# Patient Record
Sex: FEMALE | Race: WHITE | NOT HISPANIC OR LATINO | URBAN - METROPOLITAN AREA
[De-identification: names, ages, dates, MRNs, and addresses within clinical notes are randomized per-mention and may not be internally consistent; named-entity substitution may affect disease eponyms.]

---

## 2018-03-13 ENCOUNTER — EMERGENCY (EMERGENCY)
Facility: HOSPITAL | Age: 38
LOS: 1 days | Discharge: HOME | End: 2018-03-13
Attending: EMERGENCY MEDICINE | Admitting: EMERGENCY MEDICINE
Payer: COMMERCIAL

## 2018-03-13 VITALS
RESPIRATION RATE: 18 BRPM | OXYGEN SATURATION: 99 % | SYSTOLIC BLOOD PRESSURE: 134 MMHG | HEART RATE: 90 BPM | TEMPERATURE: 98 F | DIASTOLIC BLOOD PRESSURE: 81 MMHG

## 2018-03-13 VITALS — WEIGHT: 192.02 LBS

## 2018-03-13 DIAGNOSIS — Z90.89 ACQUIRED ABSENCE OF OTHER ORGANS: Chronic | ICD-10-CM

## 2018-03-13 DIAGNOSIS — Z98.82 BREAST IMPLANT STATUS: Chronic | ICD-10-CM

## 2018-03-13 DIAGNOSIS — Z90.49 ACQUIRED ABSENCE OF OTHER SPECIFIED PARTS OF DIGESTIVE TRACT: Chronic | ICD-10-CM

## 2018-03-13 DIAGNOSIS — Z96.649 PRESENCE OF UNSPECIFIED ARTIFICIAL HIP JOINT: Chronic | ICD-10-CM

## 2018-03-13 LAB
ALBUMIN SERPL ELPH-MCNC: 4.7 G/DL — SIGNIFICANT CHANGE UP (ref 3.3–5.2)
ALP SERPL-CCNC: 87 U/L — SIGNIFICANT CHANGE UP (ref 40–120)
ALT FLD-CCNC: 17 U/L — SIGNIFICANT CHANGE UP
ANION GAP SERPL CALC-SCNC: 16 MMOL/L — SIGNIFICANT CHANGE UP (ref 5–17)
APPEARANCE UR: CLEAR — SIGNIFICANT CHANGE UP
AST SERPL-CCNC: 16 U/L — SIGNIFICANT CHANGE UP
BASOPHILS # BLD AUTO: 0 K/UL — SIGNIFICANT CHANGE UP (ref 0–0.2)
BASOPHILS NFR BLD AUTO: 0.4 % — SIGNIFICANT CHANGE UP (ref 0–2)
BILIRUB SERPL-MCNC: 0.3 MG/DL — LOW (ref 0.4–2)
BILIRUB UR-MCNC: NEGATIVE — SIGNIFICANT CHANGE UP
BUN SERPL-MCNC: 15 MG/DL — SIGNIFICANT CHANGE UP (ref 8–20)
CALCIUM SERPL-MCNC: 9.7 MG/DL — SIGNIFICANT CHANGE UP (ref 8.6–10.2)
CHLORIDE SERPL-SCNC: 102 MMOL/L — SIGNIFICANT CHANGE UP (ref 98–107)
CO2 SERPL-SCNC: 24 MMOL/L — SIGNIFICANT CHANGE UP (ref 22–29)
COLOR SPEC: YELLOW — SIGNIFICANT CHANGE UP
CREAT SERPL-MCNC: 0.58 MG/DL — SIGNIFICANT CHANGE UP (ref 0.5–1.3)
DIFF PNL FLD: ABNORMAL
EOSINOPHIL # BLD AUTO: 0.2 K/UL — SIGNIFICANT CHANGE UP (ref 0–0.5)
EOSINOPHIL NFR BLD AUTO: 1.7 % — SIGNIFICANT CHANGE UP (ref 0–6)
EPI CELLS # UR: SIGNIFICANT CHANGE UP
GLUCOSE SERPL-MCNC: 93 MG/DL — SIGNIFICANT CHANGE UP (ref 70–115)
GLUCOSE UR QL: NEGATIVE MG/DL — SIGNIFICANT CHANGE UP
HCG SERPL-ACNC: <5 MIU/ML — SIGNIFICANT CHANGE UP
HCT VFR BLD CALC: 45.2 % — SIGNIFICANT CHANGE UP (ref 37–47)
HGB BLD-MCNC: 15.5 G/DL — SIGNIFICANT CHANGE UP (ref 12–16)
KETONES UR-MCNC: ABNORMAL
LEUKOCYTE ESTERASE UR-ACNC: NEGATIVE — SIGNIFICANT CHANGE UP
LIDOCAIN IGE QN: 22 U/L — SIGNIFICANT CHANGE UP (ref 22–51)
LYMPHOCYTES # BLD AUTO: 35.6 % — SIGNIFICANT CHANGE UP (ref 20–55)
LYMPHOCYTES # BLD AUTO: 4.3 K/UL — SIGNIFICANT CHANGE UP (ref 1–4.8)
MCHC RBC-ENTMCNC: 31 PG — SIGNIFICANT CHANGE UP (ref 27–31)
MCHC RBC-ENTMCNC: 34.3 G/DL — SIGNIFICANT CHANGE UP (ref 32–36)
MCV RBC AUTO: 90.4 FL — SIGNIFICANT CHANGE UP (ref 81–99)
MONOCYTES # BLD AUTO: 0.8 K/UL — SIGNIFICANT CHANGE UP (ref 0–0.8)
MONOCYTES NFR BLD AUTO: 6.8 % — SIGNIFICANT CHANGE UP (ref 3–10)
NEUTROPHILS # BLD AUTO: 6.7 K/UL — SIGNIFICANT CHANGE UP (ref 1.8–8)
NEUTROPHILS NFR BLD AUTO: 55.2 % — SIGNIFICANT CHANGE UP (ref 37–73)
NITRITE UR-MCNC: NEGATIVE — SIGNIFICANT CHANGE UP
PH UR: 6 — SIGNIFICANT CHANGE UP (ref 5–8)
PLATELET # BLD AUTO: 372 K/UL — SIGNIFICANT CHANGE UP (ref 150–400)
POTASSIUM SERPL-MCNC: 4 MMOL/L — SIGNIFICANT CHANGE UP (ref 3.5–5.3)
POTASSIUM SERPL-SCNC: 4 MMOL/L — SIGNIFICANT CHANGE UP (ref 3.5–5.3)
PROT SERPL-MCNC: 7.7 G/DL — SIGNIFICANT CHANGE UP (ref 6.6–8.7)
PROT UR-MCNC: 15 MG/DL
RBC # BLD: 5 M/UL — SIGNIFICANT CHANGE UP (ref 4.4–5.2)
RBC # FLD: 12.9 % — SIGNIFICANT CHANGE UP (ref 11–15.6)
RBC CASTS # UR COMP ASSIST: SIGNIFICANT CHANGE UP /HPF (ref 0–4)
SODIUM SERPL-SCNC: 142 MMOL/L — SIGNIFICANT CHANGE UP (ref 135–145)
SP GR SPEC: 1.02 — SIGNIFICANT CHANGE UP (ref 1.01–1.02)
UROBILINOGEN FLD QL: NEGATIVE MG/DL — SIGNIFICANT CHANGE UP
WBC # BLD: 12.1 K/UL — HIGH (ref 4.8–10.8)
WBC # FLD AUTO: 12.1 K/UL — HIGH (ref 4.8–10.8)
WBC UR QL: NEGATIVE — SIGNIFICANT CHANGE UP

## 2018-03-13 PROCEDURE — 74177 CT ABD & PELVIS W/CONTRAST: CPT

## 2018-03-13 PROCEDURE — 85027 COMPLETE CBC AUTOMATED: CPT

## 2018-03-13 PROCEDURE — 36415 COLL VENOUS BLD VENIPUNCTURE: CPT

## 2018-03-13 PROCEDURE — 84702 CHORIONIC GONADOTROPIN TEST: CPT

## 2018-03-13 PROCEDURE — 83690 ASSAY OF LIPASE: CPT

## 2018-03-13 PROCEDURE — 99284 EMERGENCY DEPT VISIT MOD MDM: CPT | Mod: 25

## 2018-03-13 PROCEDURE — 74177 CT ABD & PELVIS W/CONTRAST: CPT | Mod: 26

## 2018-03-13 PROCEDURE — 96375 TX/PRO/DX INJ NEW DRUG ADDON: CPT

## 2018-03-13 PROCEDURE — 80053 COMPREHEN METABOLIC PANEL: CPT

## 2018-03-13 PROCEDURE — 99284 EMERGENCY DEPT VISIT MOD MDM: CPT

## 2018-03-13 PROCEDURE — 96374 THER/PROPH/DIAG INJ IV PUSH: CPT | Mod: XU

## 2018-03-13 PROCEDURE — 81001 URINALYSIS AUTO W/SCOPE: CPT

## 2018-03-13 RX ORDER — SODIUM CHLORIDE 9 MG/ML
1000 INJECTION INTRAMUSCULAR; INTRAVENOUS; SUBCUTANEOUS
Qty: 0 | Refills: 0 | Status: DISCONTINUED | OUTPATIENT
Start: 2018-03-13 | End: 2018-03-17

## 2018-03-13 RX ORDER — KETOROLAC TROMETHAMINE 30 MG/ML
30 SYRINGE (ML) INJECTION ONCE
Qty: 0 | Refills: 0 | Status: DISCONTINUED | OUTPATIENT
Start: 2018-03-13 | End: 2018-03-13

## 2018-03-13 RX ORDER — METOCLOPRAMIDE HCL 10 MG
10 TABLET ORAL ONCE
Qty: 0 | Refills: 0 | Status: COMPLETED | OUTPATIENT
Start: 2018-03-13 | End: 2018-03-13

## 2018-03-13 RX ADMIN — Medication 10 MILLIGRAM(S): at 19:50

## 2018-03-13 RX ADMIN — SODIUM CHLORIDE 200 MILLILITER(S): 9 INJECTION INTRAMUSCULAR; INTRAVENOUS; SUBCUTANEOUS at 19:50

## 2018-03-13 RX ADMIN — Medication 30 MILLIGRAM(S): at 19:50

## 2018-03-13 NOTE — ED ADULT TRIAGE NOTE - CHIEF COMPLAINT QUOTE
pt c/o pain to her abdomen x months which worsened over the past 3 days. pt with nausea vomiting x 2 days

## 2018-03-13 NOTE — ED STATDOCS - PSH
S/P breast augmentation    S/P cholecystectomy    S/P hip hemiarthroplasty  age 9  S/P tonsillectomy

## 2018-03-13 NOTE — ED STATDOCS - MEDICAL DECISION MAKING DETAILS
Intermittent abdominal pain x months, increased x 3 days with nausea. Last BM yesterday. Admits to prior abdominal surgery. has not been evaluated for this. Line, labs, lipase for pancreatitis. CT for obstruction/pancreatitis/colitis. Treat symptomatically, check results, and reassess.

## 2018-03-13 NOTE — ED STATDOCS - OBJECTIVE STATEMENT
37 year old female presenting to the ED complaining of abdominal pain radiating to her back x 5 months that is recently worsening. She states that her pain used to be intermittent, but that it is becoming constant now. Pt states that her pain is more in her upper abdomen. She states that she feels nausea, has episodes of vomiting with the recent episode being 10:30AM today, and has tingling throughout her body, but denies having any fever, diarrhea, constipation. Pt states that she has PMHx of gastritis, tonsilitis, and hiatal hernia, and PSHx of cholecystectomy, hip surgery, ovarian cystectomy, , and breast augmentation. She states that she had taken Oxycodone for her pain which she is not prescribed but states that brought relief to her pain. Pt states that she is a smoker. No further complaints at this time.  PMD: Dr. Cage

## 2018-03-13 NOTE — ED ADULT NURSE NOTE - OBJECTIVE STATEMENT
pt alert and awake x3, arrived to ED with abd pain for months, states she has hx of hiatal hernia and acid reflux, denies chest pain/sob, denies urinary symptoms, LS clear.

## 2018-03-13 NOTE — ED STATDOCS - PROGRESS NOTE DETAILS
appears comfortable, feels better, hungry and wants to eat, impression with limitations discussed, findings discussed, can follow up with Dr. King, given copy of results to give to him.

## 2018-03-21 ENCOUNTER — TRANSCRIPTION ENCOUNTER (OUTPATIENT)
Age: 38
End: 2018-03-21

## 2018-03-21 ENCOUNTER — RESULT REVIEW (OUTPATIENT)
Age: 38
End: 2018-03-21

## 2018-03-21 ENCOUNTER — OUTPATIENT (OUTPATIENT)
Dept: OUTPATIENT SERVICES | Facility: HOSPITAL | Age: 38
LOS: 1 days | End: 2018-03-21
Payer: COMMERCIAL

## 2018-03-21 DIAGNOSIS — Z96.649 PRESENCE OF UNSPECIFIED ARTIFICIAL HIP JOINT: Chronic | ICD-10-CM

## 2018-03-21 DIAGNOSIS — K62.5 HEMORRHAGE OF ANUS AND RECTUM: ICD-10-CM

## 2018-03-21 DIAGNOSIS — K21.0 GASTRO-ESOPHAGEAL REFLUX DISEASE WITH ESOPHAGITIS: ICD-10-CM

## 2018-03-21 DIAGNOSIS — Z90.49 ACQUIRED ABSENCE OF OTHER SPECIFIED PARTS OF DIGESTIVE TRACT: Chronic | ICD-10-CM

## 2018-03-21 DIAGNOSIS — Z90.89 ACQUIRED ABSENCE OF OTHER ORGANS: Chronic | ICD-10-CM

## 2018-03-21 DIAGNOSIS — Z98.82 BREAST IMPLANT STATUS: Chronic | ICD-10-CM

## 2018-03-21 PROCEDURE — 45378 DIAGNOSTIC COLONOSCOPY: CPT

## 2018-03-21 PROCEDURE — 88342 IMHCHEM/IMCYTCHM 1ST ANTB: CPT

## 2018-03-21 PROCEDURE — 88305 TISSUE EXAM BY PATHOLOGIST: CPT

## 2018-03-21 PROCEDURE — 88305 TISSUE EXAM BY PATHOLOGIST: CPT | Mod: 26

## 2018-03-21 PROCEDURE — 88342 IMHCHEM/IMCYTCHM 1ST ANTB: CPT | Mod: 26

## 2018-03-21 PROCEDURE — 43239 EGD BIOPSY SINGLE/MULTIPLE: CPT

## 2018-03-27 LAB — SURGICAL PATHOLOGY FINAL REPORT - CH: SIGNIFICANT CHANGE UP

## 2020-01-22 ENCOUNTER — TRANSCRIPTION ENCOUNTER (OUTPATIENT)
Age: 40
End: 2020-01-22

## 2020-01-23 ENCOUNTER — TRANSCRIPTION ENCOUNTER (OUTPATIENT)
Age: 40
End: 2020-01-23

## 2020-01-23 ENCOUNTER — APPOINTMENT (OUTPATIENT)
Dept: GASTROENTEROLOGY | Facility: CLINIC | Age: 40
End: 2020-01-23
Payer: COMMERCIAL

## 2020-01-23 VITALS
HEART RATE: 89 BPM | WEIGHT: 185 LBS | BODY MASS INDEX: 36.32 KG/M2 | DIASTOLIC BLOOD PRESSURE: 98 MMHG | SYSTOLIC BLOOD PRESSURE: 129 MMHG | HEIGHT: 60 IN

## 2020-01-23 DIAGNOSIS — R10.9 UNSPECIFIED ABDOMINAL PAIN: ICD-10-CM

## 2020-01-23 DIAGNOSIS — K30 FUNCTIONAL DYSPEPSIA: ICD-10-CM

## 2020-01-23 DIAGNOSIS — R10.84 GENERALIZED ABDOMINAL PAIN: ICD-10-CM

## 2020-01-23 DIAGNOSIS — R12 HEARTBURN: ICD-10-CM

## 2020-01-23 DIAGNOSIS — Z87.19 PERSONAL HISTORY OF OTHER DISEASES OF THE DIGESTIVE SYSTEM: ICD-10-CM

## 2020-01-23 DIAGNOSIS — R10.13 EPIGASTRIC PAIN: ICD-10-CM

## 2020-01-23 DIAGNOSIS — R19.7 DIARRHEA, UNSPECIFIED: ICD-10-CM

## 2020-01-23 DIAGNOSIS — K21.9 GASTRO-ESOPHAGEAL REFLUX DISEASE W/OUT ESOPHAGITIS: ICD-10-CM

## 2020-01-23 DIAGNOSIS — Z83.3 FAMILY HISTORY OF DIABETES MELLITUS: ICD-10-CM

## 2020-01-23 DIAGNOSIS — Z78.9 OTHER SPECIFIED HEALTH STATUS: ICD-10-CM

## 2020-01-23 DIAGNOSIS — F17.200 NICOTINE DEPENDENCE, UNSPECIFIED, UNCOMPLICATED: ICD-10-CM

## 2020-01-23 DIAGNOSIS — R11.2 NAUSEA WITH VOMITING, UNSPECIFIED: ICD-10-CM

## 2020-01-23 DIAGNOSIS — R10.12 LEFT UPPER QUADRANT PAIN: ICD-10-CM

## 2020-01-23 DIAGNOSIS — R14.3 FLATULENCE: ICD-10-CM

## 2020-01-23 DIAGNOSIS — K31.9 DISEASE OF STOMACH AND DUODENUM, UNSPECIFIED: ICD-10-CM

## 2020-01-23 PROCEDURE — 99203 OFFICE O/P NEW LOW 30 MIN: CPT

## 2020-01-23 RX ORDER — SUCRALFATE 1 G/10ML
1 SUSPENSION ORAL 4 TIMES DAILY
Qty: 1 | Refills: 5 | Status: ACTIVE | COMMUNITY
Start: 2020-01-23 | End: 1900-01-01

## 2020-01-24 LAB
ALBUMIN SERPL ELPH-MCNC: 4.5 G/DL
ALP BLD-CCNC: 84 U/L
ALT SERPL-CCNC: 37 U/L
AMYLASE/CREAT SERPL: 81 U/L
ANION GAP SERPL CALC-SCNC: 18 MMOL/L
AST SERPL-CCNC: 23 U/L
BASOPHILS # BLD AUTO: 0.08 K/UL
BASOPHILS NFR BLD AUTO: 0.6 %
BILIRUB SERPL-MCNC: <0.2 MG/DL
BUN SERPL-MCNC: 15 MG/DL
CALCIUM SERPL-MCNC: 10.1 MG/DL
CHLORIDE SERPL-SCNC: 100 MMOL/L
CO2 SERPL-SCNC: 23 MMOL/L
CREAT SERPL-MCNC: 0.67 MG/DL
CRP SERPL-MCNC: 0.63 MG/DL
EOSINOPHIL # BLD AUTO: 0.21 K/UL
EOSINOPHIL NFR BLD AUTO: 1.7 %
ERYTHROCYTE [SEDIMENTATION RATE] IN BLOOD BY WESTERGREN METHOD: 26 MM/HR
GLUCOSE SERPL-MCNC: 87 MG/DL
HCT VFR BLD CALC: 43.3 %
HGB BLD-MCNC: 14.2 G/DL
IMM GRANULOCYTES NFR BLD AUTO: 0.5 %
LPL SERPL-CCNC: 33 U/L
LYMPHOCYTES # BLD AUTO: 4.59 K/UL
LYMPHOCYTES NFR BLD AUTO: 36.6 %
MAN DIFF?: NORMAL
MCHC RBC-ENTMCNC: 30.7 PG
MCHC RBC-ENTMCNC: 32.8 GM/DL
MCV RBC AUTO: 93.5 FL
MONOCYTES # BLD AUTO: 0.91 K/UL
MONOCYTES NFR BLD AUTO: 7.3 %
NEUTROPHILS # BLD AUTO: 6.69 K/UL
NEUTROPHILS NFR BLD AUTO: 53.3 %
PLATELET # BLD AUTO: 427 K/UL
POTASSIUM SERPL-SCNC: 5 MMOL/L
PROT SERPL-MCNC: 6.6 G/DL
RBC # BLD: 4.63 M/UL
RBC # FLD: 12.8 %
SODIUM SERPL-SCNC: 141 MMOL/L
WBC # FLD AUTO: 12.54 K/UL

## 2020-01-25 PROBLEM — R14.3 EXCESSIVE GAS: Status: ACTIVE | Noted: 2020-01-23

## 2020-01-25 PROBLEM — Z87.19 HISTORY OF ESOPHAGEAL REFLUX: Status: RESOLVED | Noted: 2020-01-23 | Resolved: 2020-01-25

## 2020-01-25 PROBLEM — R10.13 ACUTE EPIGASTRIC PAIN: Status: ACTIVE | Noted: 2020-01-23

## 2020-01-25 PROBLEM — R10.12 ACUTE LUQ PAIN: Status: ACTIVE | Noted: 2020-01-23

## 2020-01-25 PROBLEM — R12 HEARTBURN: Status: ACTIVE | Noted: 2020-01-23

## 2020-01-25 PROBLEM — Z78.9 CAFFEINE USE: Status: ACTIVE | Noted: 2020-01-23

## 2020-01-25 PROBLEM — Z78.9 SOCIAL ALCOHOL USE: Status: ACTIVE | Noted: 2020-01-23

## 2020-01-25 PROBLEM — F17.200 CURRENT EVERY DAY SMOKER: Status: ACTIVE | Noted: 2020-01-23

## 2020-01-25 PROBLEM — Z83.3 FAMILY HISTORY OF DIABETES MELLITUS: Status: ACTIVE | Noted: 2020-01-23

## 2020-01-25 PROBLEM — K30 INDIGESTION: Status: ACTIVE | Noted: 2020-01-23

## 2020-01-25 PROBLEM — R10.84 GENERALIZED ABDOMINAL PAIN: Status: ACTIVE | Noted: 2020-01-23

## 2020-01-25 PROBLEM — R11.2 NAUSEA AND VOMITING: Status: ACTIVE | Noted: 2020-01-23

## 2020-01-25 PROBLEM — Z78.9 NO KNOWN PROBLEMS: Status: RESOLVED | Noted: 2020-01-23 | Resolved: 2020-01-25

## 2020-01-25 RX ORDER — OMEPRAZOLE, SODIUM BICARBONATE 40; 1680 MG/1; MG/1
POWDER, FOR SUSPENSION ORAL
Refills: 0 | Status: ACTIVE | COMMUNITY

## 2020-01-25 NOTE — REVIEW OF SYSTEMS
[Feeling Poorly] : feeling poorly [Fever] : fever [Vomiting] : no vomiting [Abdominal Pain] : abdominal pain [As Noted in HPI] : as noted in HPI [Diarrhea] : diarrhea [Constipation] : constipation [Negative] : Heme/Lymph

## 2020-01-25 NOTE — PHYSICAL EXAM
[General Appearance - Alert] : alert [General Appearance - In No Acute Distress] : in no acute distress [PERRL With Normal Accommodation] : pupils were equal in size, round, and reactive to light [Sclera] : the sclera and conjunctiva were normal [Extraocular Movements] : extraocular movements were intact [Outer Ear] : the ears and nose were normal in appearance [Oropharynx] : the oropharynx was normal [Neck Cervical Mass (___cm)] : no neck mass was observed [Jugular Venous Distention Increased] : there was no jugular-venous distention [Neck Appearance] : the appearance of the neck was normal [Thyroid Nodule] : there were no palpable thyroid nodules [Thyroid Diffuse Enlargement] : the thyroid was not enlarged [Auscultation Breath Sounds / Voice Sounds] : lungs were clear to auscultation bilaterally [Heart Sounds] : normal S1 and S2 [Heart Rate And Rhythm] : heart rate was normal and rhythm regular [Heart Sounds Pericardial Friction Rub] : no pericardial rub [Murmurs] : no murmurs [Heart Sounds Gallop] : no gallops [Abdomen Soft] : soft [Bowel Sounds] : normal bowel sounds [] : no hepato-splenomegaly [Abdomen Mass (___ Cm)] : no abdominal mass palpated [Abnormal Walk] : normal gait [FreeTextEntry1] : tender epigastrium and LUQ [Nail Clubbing] : no clubbing  or cyanosis of the fingernails [Oriented To Time, Place, And Person] : oriented to person, place, and time [Motor Tone] : muscle strength and tone were normal [Musculoskeletal - Swelling] : no joint swelling seen [Impaired Insight] : insight and judgment were intact [Affect] : the affect was normal

## 2020-01-25 NOTE — HISTORY OF PRESENT ILLNESS
[de-identified] : 38yo female for evaluation of severe epigastric/LUQ pain\par Started last week and getting worse\par She had been away on vacation and drank EtOH and ate heavy food.  She felt well and symptoms started a few days after returning - no further EtOH\par She has severe burning pain in epigastrium and LUQ with some radiation to her back\par Decreased BM and appetite\par She reports history of ulcer 20 years ago but unsure if felt like this

## 2020-01-26 ENCOUNTER — EMERGENCY (EMERGENCY)
Facility: HOSPITAL | Age: 40
LOS: 1 days | Discharge: HOME | End: 2020-01-26
Attending: EMERGENCY MEDICINE
Payer: COMMERCIAL

## 2020-01-26 VITALS
SYSTOLIC BLOOD PRESSURE: 135 MMHG | WEIGHT: 182.1 LBS | HEIGHT: 60 IN | DIASTOLIC BLOOD PRESSURE: 80 MMHG | RESPIRATION RATE: 20 BRPM | OXYGEN SATURATION: 100 % | TEMPERATURE: 98 F | HEART RATE: 89 BPM

## 2020-01-26 DIAGNOSIS — Z98.82 BREAST IMPLANT STATUS: Chronic | ICD-10-CM

## 2020-01-26 DIAGNOSIS — Z90.49 ACQUIRED ABSENCE OF OTHER SPECIFIED PARTS OF DIGESTIVE TRACT: Chronic | ICD-10-CM

## 2020-01-26 DIAGNOSIS — Z96.649 PRESENCE OF UNSPECIFIED ARTIFICIAL HIP JOINT: Chronic | ICD-10-CM

## 2020-01-26 DIAGNOSIS — Z90.89 ACQUIRED ABSENCE OF OTHER ORGANS: Chronic | ICD-10-CM

## 2020-01-26 LAB
ALBUMIN SERPL ELPH-MCNC: 4.3 G/DL — SIGNIFICANT CHANGE UP (ref 3.3–5.2)
ALP SERPL-CCNC: 79 U/L — SIGNIFICANT CHANGE UP (ref 40–120)
ALT FLD-CCNC: 23 U/L — SIGNIFICANT CHANGE UP
ANION GAP SERPL CALC-SCNC: 11 MMOL/L — SIGNIFICANT CHANGE UP (ref 5–17)
AST SERPL-CCNC: 17 U/L — SIGNIFICANT CHANGE UP
BASOPHILS # BLD AUTO: 0.12 K/UL — SIGNIFICANT CHANGE UP (ref 0–0.2)
BASOPHILS NFR BLD AUTO: 0.9 % — SIGNIFICANT CHANGE UP (ref 0–2)
BILIRUB SERPL-MCNC: <0.2 MG/DL — LOW (ref 0.4–2)
BUN SERPL-MCNC: 16 MG/DL — SIGNIFICANT CHANGE UP (ref 8–20)
CALCIUM SERPL-MCNC: 9.5 MG/DL — SIGNIFICANT CHANGE UP (ref 8.6–10.2)
CHLORIDE SERPL-SCNC: 101 MMOL/L — SIGNIFICANT CHANGE UP (ref 98–107)
CO2 SERPL-SCNC: 26 MMOL/L — SIGNIFICANT CHANGE UP (ref 22–29)
CREAT SERPL-MCNC: 0.61 MG/DL — SIGNIFICANT CHANGE UP (ref 0.5–1.3)
EOSINOPHIL # BLD AUTO: 0.47 K/UL — SIGNIFICANT CHANGE UP (ref 0–0.5)
EOSINOPHIL NFR BLD AUTO: 3.5 % — SIGNIFICANT CHANGE UP (ref 0–6)
GIANT PLATELETS BLD QL SMEAR: PRESENT — SIGNIFICANT CHANGE UP
GLUCOSE SERPL-MCNC: 80 MG/DL — SIGNIFICANT CHANGE UP (ref 70–99)
HCG SERPL-ACNC: <4 MIU/ML — SIGNIFICANT CHANGE UP
HCT VFR BLD CALC: 42.2 % — SIGNIFICANT CHANGE UP (ref 34.5–45)
HGB BLD-MCNC: 13.7 G/DL — SIGNIFICANT CHANGE UP (ref 11.5–15.5)
LIDOCAIN IGE QN: 31 U/L — SIGNIFICANT CHANGE UP (ref 22–51)
LYMPHOCYTES # BLD AUTO: 40 % — SIGNIFICANT CHANGE UP (ref 13–44)
LYMPHOCYTES # BLD AUTO: 5.39 K/UL — HIGH (ref 1–3.3)
MANUAL SMEAR VERIFICATION: SIGNIFICANT CHANGE UP
MCHC RBC-ENTMCNC: 30 PG — SIGNIFICANT CHANGE UP (ref 27–34)
MCHC RBC-ENTMCNC: 32.5 GM/DL — SIGNIFICANT CHANGE UP (ref 32–36)
MCV RBC AUTO: 92.3 FL — SIGNIFICANT CHANGE UP (ref 80–100)
MONOCYTES # BLD AUTO: 1.05 K/UL — HIGH (ref 0–0.9)
MONOCYTES NFR BLD AUTO: 7.8 % — SIGNIFICANT CHANGE UP (ref 2–14)
NEUTROPHILS # BLD AUTO: 6.21 K/UL — SIGNIFICANT CHANGE UP (ref 1.8–7.4)
NEUTROPHILS NFR BLD AUTO: 46.1 % — SIGNIFICANT CHANGE UP (ref 43–77)
PLAT MORPH BLD: NORMAL — SIGNIFICANT CHANGE UP
PLATELET # BLD AUTO: 394 K/UL — SIGNIFICANT CHANGE UP (ref 150–400)
POTASSIUM SERPL-MCNC: 4.5 MMOL/L — SIGNIFICANT CHANGE UP (ref 3.5–5.3)
POTASSIUM SERPL-SCNC: 4.5 MMOL/L — SIGNIFICANT CHANGE UP (ref 3.5–5.3)
PROT SERPL-MCNC: 6.8 G/DL — SIGNIFICANT CHANGE UP (ref 6.6–8.7)
RBC # BLD: 4.57 M/UL — SIGNIFICANT CHANGE UP (ref 3.8–5.2)
RBC # FLD: 12.6 % — SIGNIFICANT CHANGE UP (ref 10.3–14.5)
RBC BLD AUTO: NORMAL — SIGNIFICANT CHANGE UP
SODIUM SERPL-SCNC: 138 MMOL/L — SIGNIFICANT CHANGE UP (ref 135–145)
VARIANT LYMPHS # BLD: 1.7 % — SIGNIFICANT CHANGE UP (ref 0–6)
WBC # BLD: 13.47 K/UL — HIGH (ref 3.8–10.5)
WBC # FLD AUTO: 13.47 K/UL — HIGH (ref 3.8–10.5)

## 2020-01-26 PROCEDURE — 74177 CT ABD & PELVIS W/CONTRAST: CPT

## 2020-01-26 PROCEDURE — 85027 COMPLETE CBC AUTOMATED: CPT

## 2020-01-26 PROCEDURE — 80053 COMPREHEN METABOLIC PANEL: CPT

## 2020-01-26 PROCEDURE — 99285 EMERGENCY DEPT VISIT HI MDM: CPT

## 2020-01-26 PROCEDURE — 84702 CHORIONIC GONADOTROPIN TEST: CPT

## 2020-01-26 PROCEDURE — 36415 COLL VENOUS BLD VENIPUNCTURE: CPT

## 2020-01-26 PROCEDURE — 93010 ELECTROCARDIOGRAM REPORT: CPT

## 2020-01-26 PROCEDURE — 96375 TX/PRO/DX INJ NEW DRUG ADDON: CPT

## 2020-01-26 PROCEDURE — 74177 CT ABD & PELVIS W/CONTRAST: CPT | Mod: 26

## 2020-01-26 PROCEDURE — 99284 EMERGENCY DEPT VISIT MOD MDM: CPT | Mod: 25

## 2020-01-26 PROCEDURE — 83690 ASSAY OF LIPASE: CPT

## 2020-01-26 PROCEDURE — 93005 ELECTROCARDIOGRAM TRACING: CPT

## 2020-01-26 PROCEDURE — 96361 HYDRATE IV INFUSION ADD-ON: CPT

## 2020-01-26 PROCEDURE — 96374 THER/PROPH/DIAG INJ IV PUSH: CPT | Mod: XU

## 2020-01-26 RX ORDER — SODIUM CHLORIDE 9 MG/ML
1000 INJECTION INTRAMUSCULAR; INTRAVENOUS; SUBCUTANEOUS ONCE
Refills: 0 | Status: COMPLETED | OUTPATIENT
Start: 2020-01-26 | End: 2020-01-26

## 2020-01-26 RX ORDER — ONDANSETRON 8 MG/1
4 TABLET, FILM COATED ORAL ONCE
Refills: 0 | Status: COMPLETED | OUTPATIENT
Start: 2020-01-26 | End: 2020-01-26

## 2020-01-26 RX ORDER — MORPHINE SULFATE 50 MG/1
4 CAPSULE, EXTENDED RELEASE ORAL ONCE
Refills: 0 | Status: DISCONTINUED | OUTPATIENT
Start: 2020-01-26 | End: 2020-01-26

## 2020-01-26 RX ADMIN — SODIUM CHLORIDE 1000 MILLILITER(S): 9 INJECTION INTRAMUSCULAR; INTRAVENOUS; SUBCUTANEOUS at 19:26

## 2020-01-26 RX ADMIN — ONDANSETRON 4 MILLIGRAM(S): 8 TABLET, FILM COATED ORAL at 19:25

## 2020-01-26 RX ADMIN — MORPHINE SULFATE 4 MILLIGRAM(S): 50 CAPSULE, EXTENDED RELEASE ORAL at 19:25

## 2020-01-26 RX ADMIN — SODIUM CHLORIDE 1000 MILLILITER(S): 9 INJECTION INTRAMUSCULAR; INTRAVENOUS; SUBCUTANEOUS at 20:26

## 2020-01-26 NOTE — ED STATDOCS - PROGRESS NOTE DETAILS
LILIAN Sheldon: Patient evaluated by intake physician. HPI/ROS/PE as noted above. Will follow up plan per intake physician and continue to assess patient.

## 2020-01-26 NOTE — ED STATDOCS - ATTENDING CONTRIBUTION TO CARE
I, Los Wright, performed the initial face to face bedside interview with this patient regarding history of present illness, review of symptoms and relevant past medical, social and family history.  I completed an independent physical examination.  I was the initial provider who evaluated this patient. I have signed out the follow up of any pending tests (i.e. labs, radiological studies) to the ACP.  I have communicated the patient’s plan of care and disposition with the ACP.  The history, relevant review of systems, past medical and surgical history, medical decision making, and physical examination was documented by the scribe in my presence and I attest to the accuracy of the documentation.

## 2020-01-26 NOTE — ED STATDOCS - PATIENT PORTAL LINK FT
You can access the FollowMyHealth Patient Portal offered by [facility] by registering at the following website: [email or web address]. By joining [employer]’s FollowMyHealth portal, you will also be able to view your health information using other applications (apps) compatible with our system.

## 2020-01-26 NOTE — ED STATDOCS - OBJECTIVE STATEMENT
40 y/o F with a significant PMHx of GERD presents to the ED c/o epigastric pain onset about one week ago. Pt states that the pain is worsening and has began radiating into her back. Pt notes that she was seen in the walk in clinic on Wednesday where she was told to see a GI specialist- she was seen by the specialist on Thursday where they tested her pancreas enzymes. Pt notes that the levels were normal aside from a raised WBC count. Pt was given Omeprazole and Sucralfate with no relief of symptoms. Pt states that she has a endoscopy scheduled for Friday but could not bare the pain and so she presents to the ED. Pt notes that every time she eats she vomits within about 10 minutes. Pt denies any dysuria, fever, or diarrhea. NKDA. No further complaints at this time.     epi  throwing up right qadter eating  pain in abd  walk in wedn, saw gi doc thurs tested blood panc ok  white count ok  endo friday  meds 3d no help    luq rad to back

## 2020-01-26 NOTE — ED STATDOCS - CLINICAL SUMMARY MEDICAL DECISION MAKING FREE TEXT BOX
38 y/o F with epigastric pain radiating into her back concerning for pancreatitis. Will obtain labs, CT, and reevaluate

## 2020-01-31 ENCOUNTER — APPOINTMENT (OUTPATIENT)
Dept: GASTROENTEROLOGY | Facility: AMBULATORY MEDICAL SERVICES | Age: 40
End: 2020-01-31
Payer: MEDICARE

## 2020-01-31 ENCOUNTER — RESULT REVIEW (OUTPATIENT)
Age: 40
End: 2020-01-31

## 2020-01-31 PROCEDURE — 43239 EGD BIOPSY SINGLE/MULTIPLE: CPT

## 2020-02-04 DIAGNOSIS — K29.70 GASTRITIS, UNSPECIFIED, W/OUT BLEEDING: ICD-10-CM

## 2020-02-04 RX ORDER — OMEPRAZOLE 20 MG/1
20 CAPSULE, DELAYED RELEASE ORAL DAILY
Qty: 90 | Refills: 3 | Status: ACTIVE | COMMUNITY
Start: 2020-02-04 | End: 1900-01-01

## 2020-03-09 ENCOUNTER — TRANSCRIPTION ENCOUNTER (OUTPATIENT)
Age: 40
End: 2020-03-09

## 2020-04-02 ENCOUNTER — APPOINTMENT (OUTPATIENT)
Dept: GASTROENTEROLOGY | Facility: CLINIC | Age: 40
End: 2020-04-02

## 2021-08-18 ENCOUNTER — TRANSCRIPTION ENCOUNTER (OUTPATIENT)
Age: 41
End: 2021-08-18

## 2021-12-21 ENCOUNTER — TRANSCRIPTION ENCOUNTER (OUTPATIENT)
Age: 41
End: 2021-12-21

## 2022-04-06 ENCOUNTER — NON-APPOINTMENT (OUTPATIENT)
Age: 42
End: 2022-04-06

## 2022-04-08 ENCOUNTER — APPOINTMENT (OUTPATIENT)
Dept: OBGYN | Facility: CLINIC | Age: 42
End: 2022-04-08
Payer: COMMERCIAL

## 2022-04-08 ENCOUNTER — LABORATORY RESULT (OUTPATIENT)
Age: 42
End: 2022-04-08

## 2022-04-08 VITALS
HEART RATE: 89 BPM | HEIGHT: 60 IN | BODY MASS INDEX: 37.21 KG/M2 | DIASTOLIC BLOOD PRESSURE: 81 MMHG | SYSTOLIC BLOOD PRESSURE: 129 MMHG | WEIGHT: 189.56 LBS

## 2022-04-08 DIAGNOSIS — Z01.419 ENCOUNTER FOR GYNECOLOGICAL EXAMINATION (GENERAL) (ROUTINE) W/OUT ABNORMAL FINDINGS: ICD-10-CM

## 2022-04-08 DIAGNOSIS — Z12.39 ENCOUNTER FOR OTHER SCREENING FOR MALIGNANT NEOPLASM OF BREAST: ICD-10-CM

## 2022-04-08 PROCEDURE — 99386 PREV VISIT NEW AGE 40-64: CPT

## 2022-04-08 NOTE — PHYSICAL EXAM
[Chaperone Present] : A chaperone was present in the examining room during all aspects of the physical examination [FreeTextEntry1] : Elida [Appropriately responsive] : appropriately responsive [Alert] : alert [No Acute Distress] : no acute distress [No Lymphadenopathy] : no lymphadenopathy [Regular Rate Rhythm] : regular rate rhythm [No Murmurs] : no murmurs [Clear to Auscultation B/L] : clear to auscultation bilaterally [Soft] : soft [Non-tender] : non-tender [Non-distended] : non-distended [No HSM] : No HSM [No Lesions] : no lesions [No Mass] : no mass [Oriented x3] : oriented x3 [Examination Of The Breasts] : a normal appearance [No Masses] : no breast masses were palpable [Labia Majora] : normal [Labia Minora] : normal [Normal] : normal [Uterine Adnexae] : normal

## 2022-04-09 LAB
C TRACH RRNA SPEC QL NAA+PROBE: NOT DETECTED
HPV HIGH+LOW RISK DNA PNL CVX: DETECTED
N GONORRHOEA RRNA SPEC QL NAA+PROBE: NOT DETECTED
SOURCE TP AMPLIFICATION: NORMAL

## 2022-04-15 ENCOUNTER — NON-APPOINTMENT (OUTPATIENT)
Age: 42
End: 2022-04-15

## 2022-04-15 LAB — CYTOLOGY CVX/VAG DOC THIN PREP: ABNORMAL

## 2022-05-10 ENCOUNTER — RESULT CHARGE (OUTPATIENT)
Age: 42
End: 2022-05-10

## 2022-05-10 ENCOUNTER — APPOINTMENT (OUTPATIENT)
Dept: OBGYN | Facility: CLINIC | Age: 42
End: 2022-05-10
Payer: COMMERCIAL

## 2022-05-10 VITALS — DIASTOLIC BLOOD PRESSURE: 81 MMHG | HEIGHT: 60 IN | SYSTOLIC BLOOD PRESSURE: 114 MMHG

## 2022-05-10 LAB
HCG UR QL: NEGATIVE
QUALITY CONTROL: YES

## 2022-05-10 PROCEDURE — 57454 BX/CURETT OF CERVIX W/SCOPE: CPT

## 2022-05-10 NOTE — PROCEDURE
[Colposcopy] : Colposcopy  [Time out performed] : Pre-procedure time out performed.  Patient's name, date of birth and procedure confirmed. [Consent Obtained] : Consent obtained [Risks] : risks [Benefits] : benefits [Alternatives] : alternatives [Patient] : patient [Infection] : infection [Bleeding] : bleeding [Allergic Reaction] : allergic reaction [HGSIL] : HGSIL [Colposcopy Adequate] : colposcopy adequate [Pap Performed] : pap not performed [SCI Fully Visualized] : SCI fully visualized [ECC Performed] : ECC performed [No Abnormalities] : no abnormalities [Lesion] : lesion seen [Biopsy] : biopsy taken [Hemostasis Obtained] : Hemostasis obtained [Tolerated Well] : the patient tolerated the procedure well [de-identified] : 3 [de-identified] : Acetowhite changes and mosaic type pattern noted at 12 and 1:00 location. [de-identified] : 12:00 and 1:00 location. [de-identified] : 41-year-old patient presents with high-grade TRISH for colposcopic evaluation.  Colposcopy was adequate and acetowhite changes with some mosaic pattern noted.  Cervical biopsy plus endocervical curetting obtained and sent to pathology.  Patient tolerated the procedure well. [de-identified] : Punch biopsy taken.

## 2022-05-17 LAB — CORE LAB BIOPSY: NORMAL

## 2022-05-24 ENCOUNTER — APPOINTMENT (OUTPATIENT)
Dept: OBGYN | Facility: CLINIC | Age: 42
End: 2022-05-24
Payer: COMMERCIAL

## 2022-05-24 ENCOUNTER — APPOINTMENT (OUTPATIENT)
Dept: GASTROENTEROLOGY | Facility: CLINIC | Age: 42
End: 2022-05-24
Payer: COMMERCIAL

## 2022-05-24 VITALS
DIASTOLIC BLOOD PRESSURE: 70 MMHG | SYSTOLIC BLOOD PRESSURE: 122 MMHG | HEIGHT: 60 IN | BODY MASS INDEX: 36.77 KG/M2 | WEIGHT: 187.31 LBS

## 2022-05-24 VITALS
SYSTOLIC BLOOD PRESSURE: 117 MMHG | BODY MASS INDEX: 37.11 KG/M2 | WEIGHT: 189 LBS | HEIGHT: 60 IN | DIASTOLIC BLOOD PRESSURE: 92 MMHG | HEART RATE: 90 BPM

## 2022-05-24 DIAGNOSIS — Z86.010 PERSONAL HISTORY OF COLONIC POLYPS: ICD-10-CM

## 2022-05-24 PROCEDURE — 99213 OFFICE O/P EST LOW 20 MIN: CPT

## 2022-05-24 PROCEDURE — 99214 OFFICE O/P EST MOD 30 MIN: CPT

## 2022-05-24 RX ORDER — SODIUM SULFATE, MAGNESIUM SULFATE, AND POTASSIUM CHLORIDE 17.75; 2.7; 2.25 G/1; G/1; G/1
1479-225-188 TABLET ORAL
Qty: 24 | Refills: 0 | Status: ACTIVE | COMMUNITY
Start: 2022-05-24 | End: 1900-01-01

## 2022-05-25 NOTE — HISTORY OF PRESENT ILLNESS
[FreeTextEntry1] : 41 F presenting for colonoscopy evaluation. She reports she had a colonoscopy many years ago with her previous GI, and had multiple polypectomies that were benign. Was advised to have colonoscopy by her GYN, as she was diagnosed last week with cervical cancer, and her father  from rectal cancer earlier this year. She is establishing care with GYN/ONC today for cervical cancer. Has hx of hiatal hernia  and gastritis, but she reports she is able to control symptoms with diet, and pepcid use when needed. Denies chest pain, shortness of breath, nausea, vomiting, abdominal pain, diarrhea, constipation, melena, hematochezia, unintentional weight changes, fevers, chills.

## 2022-05-25 NOTE — ASSESSMENT
[FreeTextEntry1] : 41 F presenting for colonoscopy evaluation. She reports she had a colonoscopy many years ago with her previous GI, and had multiple polypectomies that were benign. Was advised to have colonoscopy by her GYN, as she was diagnosed last week with cervical cancer, and her father  from rectal cancer earlier this year. Due to this history will plan for colonoscopy. Discussed with patient prep, procedure, and risks. Verbalized understanding. Prep sent to pharmacy. All questions answered. Discussed with Dr. Moya. \par

## 2022-06-14 ENCOUNTER — APPOINTMENT (OUTPATIENT)
Dept: GASTROENTEROLOGY | Facility: AMBULATORY MEDICAL SERVICES | Age: 42
End: 2022-06-14
Payer: COMMERCIAL

## 2022-06-14 LAB — SARS-COV-2 N GENE NPH QL NAA+PROBE: NOT DETECTED

## 2022-06-14 PROCEDURE — 45378 DIAGNOSTIC COLONOSCOPY: CPT | Mod: GC

## 2022-07-11 ENCOUNTER — OUTPATIENT (OUTPATIENT)
Dept: OUTPATIENT SERVICES | Facility: HOSPITAL | Age: 42
LOS: 1 days | End: 2022-07-11
Payer: COMMERCIAL

## 2022-07-11 DIAGNOSIS — Z01.818 ENCOUNTER FOR OTHER PREPROCEDURAL EXAMINATION: ICD-10-CM

## 2022-07-11 DIAGNOSIS — Z98.82 BREAST IMPLANT STATUS: Chronic | ICD-10-CM

## 2022-07-11 DIAGNOSIS — Z96.649 PRESENCE OF UNSPECIFIED ARTIFICIAL HIP JOINT: Chronic | ICD-10-CM

## 2022-07-11 DIAGNOSIS — Z90.89 ACQUIRED ABSENCE OF OTHER ORGANS: Chronic | ICD-10-CM

## 2022-07-11 DIAGNOSIS — Z90.49 ACQUIRED ABSENCE OF OTHER SPECIFIED PARTS OF DIGESTIVE TRACT: Chronic | ICD-10-CM

## 2022-07-11 LAB
A1C WITH ESTIMATED AVERAGE GLUCOSE RESULT: 5.4 % — SIGNIFICANT CHANGE UP (ref 4–5.6)
ANION GAP SERPL CALC-SCNC: 14 MMOL/L — SIGNIFICANT CHANGE UP (ref 5–17)
APPEARANCE UR: ABNORMAL
APTT BLD: 30.3 SEC — SIGNIFICANT CHANGE UP (ref 27.5–35.5)
BACTERIA # UR AUTO: ABNORMAL
BASOPHILS # BLD AUTO: 0.08 K/UL — SIGNIFICANT CHANGE UP (ref 0–0.2)
BASOPHILS NFR BLD AUTO: 0.6 % — SIGNIFICANT CHANGE UP (ref 0–2)
BILIRUB UR-MCNC: NEGATIVE — SIGNIFICANT CHANGE UP
BUN SERPL-MCNC: 15.8 MG/DL — SIGNIFICANT CHANGE UP (ref 8–20)
CALCIUM SERPL-MCNC: 9.5 MG/DL — SIGNIFICANT CHANGE UP (ref 8.6–10.2)
CHLORIDE SERPL-SCNC: 99 MMOL/L — SIGNIFICANT CHANGE UP (ref 98–107)
CO2 SERPL-SCNC: 25 MMOL/L — SIGNIFICANT CHANGE UP (ref 22–29)
COLOR SPEC: YELLOW — SIGNIFICANT CHANGE UP
CREAT SERPL-MCNC: 0.69 MG/DL — SIGNIFICANT CHANGE UP (ref 0.5–1.3)
DIFF PNL FLD: NEGATIVE — SIGNIFICANT CHANGE UP
EGFR: 111 ML/MIN/1.73M2 — SIGNIFICANT CHANGE UP
EOSINOPHIL # BLD AUTO: 0.24 K/UL — SIGNIFICANT CHANGE UP (ref 0–0.5)
EOSINOPHIL NFR BLD AUTO: 1.7 % — SIGNIFICANT CHANGE UP (ref 0–6)
EPI CELLS # UR: SIGNIFICANT CHANGE UP
ESTIMATED AVERAGE GLUCOSE: 108 MG/DL — SIGNIFICANT CHANGE UP (ref 68–114)
GLUCOSE SERPL-MCNC: 106 MG/DL — HIGH (ref 70–99)
GLUCOSE UR QL: NEGATIVE MG/DL — SIGNIFICANT CHANGE UP
HCG SERPL-ACNC: <4 MIU/ML — SIGNIFICANT CHANGE UP
HCT VFR BLD CALC: 44.4 % — SIGNIFICANT CHANGE UP (ref 34.5–45)
HGB BLD-MCNC: 14.8 G/DL — SIGNIFICANT CHANGE UP (ref 11.5–15.5)
IMM GRANULOCYTES NFR BLD AUTO: 0.3 % — SIGNIFICANT CHANGE UP (ref 0–1.5)
INR BLD: 0.95 RATIO — SIGNIFICANT CHANGE UP (ref 0.88–1.16)
KETONES UR-MCNC: NEGATIVE — SIGNIFICANT CHANGE UP
LEUKOCYTE ESTERASE UR-ACNC: NEGATIVE — SIGNIFICANT CHANGE UP
LYMPHOCYTES # BLD AUTO: 2.88 K/UL — SIGNIFICANT CHANGE UP (ref 1–3.3)
LYMPHOCYTES # BLD AUTO: 20.3 % — SIGNIFICANT CHANGE UP (ref 13–44)
MCHC RBC-ENTMCNC: 30.9 PG — SIGNIFICANT CHANGE UP (ref 27–34)
MCHC RBC-ENTMCNC: 33.3 GM/DL — SIGNIFICANT CHANGE UP (ref 32–36)
MCV RBC AUTO: 92.7 FL — SIGNIFICANT CHANGE UP (ref 80–100)
MONOCYTES # BLD AUTO: 1.25 K/UL — HIGH (ref 0–0.9)
MONOCYTES NFR BLD AUTO: 8.8 % — SIGNIFICANT CHANGE UP (ref 2–14)
NEUTROPHILS # BLD AUTO: 9.72 K/UL — HIGH (ref 1.8–7.4)
NEUTROPHILS NFR BLD AUTO: 68.3 % — SIGNIFICANT CHANGE UP (ref 43–77)
NITRITE UR-MCNC: NEGATIVE — SIGNIFICANT CHANGE UP
PH UR: 8 — SIGNIFICANT CHANGE UP (ref 5–8)
PLATELET # BLD AUTO: 428 K/UL — HIGH (ref 150–400)
POTASSIUM SERPL-MCNC: 4.6 MMOL/L — SIGNIFICANT CHANGE UP (ref 3.5–5.3)
POTASSIUM SERPL-SCNC: 4.6 MMOL/L — SIGNIFICANT CHANGE UP (ref 3.5–5.3)
PROT UR-MCNC: NEGATIVE — SIGNIFICANT CHANGE UP
PROTHROM AB SERPL-ACNC: 11 SEC — SIGNIFICANT CHANGE UP (ref 10.5–13.4)
RBC # BLD: 4.79 M/UL — SIGNIFICANT CHANGE UP (ref 3.8–5.2)
RBC # FLD: 13.8 % — SIGNIFICANT CHANGE UP (ref 10.3–14.5)
RBC CASTS # UR COMP ASSIST: SIGNIFICANT CHANGE UP /HPF (ref 0–4)
SODIUM SERPL-SCNC: 138 MMOL/L — SIGNIFICANT CHANGE UP (ref 135–145)
SP GR SPEC: 1.01 — SIGNIFICANT CHANGE UP (ref 1.01–1.02)
UROBILINOGEN FLD QL: NEGATIVE MG/DL — SIGNIFICANT CHANGE UP
WBC # BLD: 14.21 K/UL — HIGH (ref 3.8–10.5)
WBC # FLD AUTO: 14.21 K/UL — HIGH (ref 3.8–10.5)
WBC UR QL: SIGNIFICANT CHANGE UP /HPF (ref 0–5)

## 2022-07-11 PROCEDURE — G0463: CPT

## 2022-07-12 DIAGNOSIS — Z01.818 ENCOUNTER FOR OTHER PREPROCEDURAL EXAMINATION: ICD-10-CM

## 2022-07-12 LAB
CULTURE RESULTS: SIGNIFICANT CHANGE UP
SPECIMEN SOURCE: SIGNIFICANT CHANGE UP

## 2022-07-14 ENCOUNTER — OUTPATIENT (OUTPATIENT)
Dept: OUTPATIENT SERVICES | Facility: HOSPITAL | Age: 42
LOS: 1 days | End: 2022-07-14
Payer: COMMERCIAL

## 2022-07-14 DIAGNOSIS — Z96.649 PRESENCE OF UNSPECIFIED ARTIFICIAL HIP JOINT: Chronic | ICD-10-CM

## 2022-07-14 DIAGNOSIS — Z90.49 ACQUIRED ABSENCE OF OTHER SPECIFIED PARTS OF DIGESTIVE TRACT: Chronic | ICD-10-CM

## 2022-07-14 DIAGNOSIS — Z01.812 ENCOUNTER FOR PREPROCEDURAL LABORATORY EXAMINATION: ICD-10-CM

## 2022-07-14 DIAGNOSIS — Z98.82 BREAST IMPLANT STATUS: Chronic | ICD-10-CM

## 2022-07-14 DIAGNOSIS — Z90.89 ACQUIRED ABSENCE OF OTHER ORGANS: Chronic | ICD-10-CM

## 2022-07-14 LAB
BASOPHILS # BLD AUTO: 0.09 K/UL — SIGNIFICANT CHANGE UP (ref 0–0.2)
BASOPHILS NFR BLD AUTO: 0.9 % — SIGNIFICANT CHANGE UP (ref 0–2)
EOSINOPHIL # BLD AUTO: 0.16 K/UL — SIGNIFICANT CHANGE UP (ref 0–0.5)
EOSINOPHIL NFR BLD AUTO: 1.7 % — SIGNIFICANT CHANGE UP (ref 0–6)
HCT VFR BLD CALC: 45.5 % — HIGH (ref 34.5–45)
HGB BLD-MCNC: 14.7 G/DL — SIGNIFICANT CHANGE UP (ref 11.5–15.5)
IMM GRANULOCYTES NFR BLD AUTO: 0.3 % — SIGNIFICANT CHANGE UP (ref 0–1.5)
LYMPHOCYTES # BLD AUTO: 2.87 K/UL — SIGNIFICANT CHANGE UP (ref 1–3.3)
LYMPHOCYTES # BLD AUTO: 30.1 % — SIGNIFICANT CHANGE UP (ref 13–44)
MCHC RBC-ENTMCNC: 30.2 PG — SIGNIFICANT CHANGE UP (ref 27–34)
MCHC RBC-ENTMCNC: 32.3 GM/DL — SIGNIFICANT CHANGE UP (ref 32–36)
MCV RBC AUTO: 93.6 FL — SIGNIFICANT CHANGE UP (ref 80–100)
MONOCYTES # BLD AUTO: 0.72 K/UL — SIGNIFICANT CHANGE UP (ref 0–0.9)
MONOCYTES NFR BLD AUTO: 7.5 % — SIGNIFICANT CHANGE UP (ref 2–14)
NEUTROPHILS # BLD AUTO: 5.67 K/UL — SIGNIFICANT CHANGE UP (ref 1.8–7.4)
NEUTROPHILS NFR BLD AUTO: 59.5 % — SIGNIFICANT CHANGE UP (ref 43–77)
PLATELET # BLD AUTO: 446 K/UL — HIGH (ref 150–400)
RBC # BLD: 4.86 M/UL — SIGNIFICANT CHANGE UP (ref 3.8–5.2)
RBC # FLD: 13.7 % — SIGNIFICANT CHANGE UP (ref 10.3–14.5)
WBC # BLD: 9.54 K/UL — SIGNIFICANT CHANGE UP (ref 3.8–10.5)
WBC # FLD AUTO: 9.54 K/UL — SIGNIFICANT CHANGE UP (ref 3.8–10.5)

## 2022-07-14 PROCEDURE — 36415 COLL VENOUS BLD VENIPUNCTURE: CPT

## 2022-07-14 PROCEDURE — 85025 COMPLETE CBC W/AUTO DIFF WBC: CPT

## 2022-07-15 LAB — SARS-COV-2 N GENE NPH QL NAA+PROBE: NOT DETECTED

## 2022-07-18 ENCOUNTER — APPOINTMENT (OUTPATIENT)
Dept: OBGYN | Facility: AMBULATORY SURGERY CENTER | Age: 42
End: 2022-07-18

## 2022-07-18 ENCOUNTER — RESULT REVIEW (OUTPATIENT)
Age: 42
End: 2022-07-18

## 2022-07-18 PROCEDURE — 57520 CONIZATION OF CERVIX: CPT

## 2022-07-18 RX ORDER — OXYCODONE HYDROCHLORIDE 5 MG/1
1 TABLET ORAL
Qty: 20 | Refills: 0
Start: 2022-07-18 | End: 2022-07-22

## 2022-07-25 ENCOUNTER — APPOINTMENT (OUTPATIENT)
Dept: OBGYN | Facility: CLINIC | Age: 42
End: 2022-07-25

## 2022-07-25 VITALS
SYSTOLIC BLOOD PRESSURE: 123 MMHG | HEIGHT: 60 IN | WEIGHT: 191.2 LBS | BODY MASS INDEX: 37.54 KG/M2 | DIASTOLIC BLOOD PRESSURE: 85 MMHG

## 2022-07-25 PROCEDURE — 99024 POSTOP FOLLOW-UP VISIT: CPT

## 2022-09-07 ENCOUNTER — APPOINTMENT (OUTPATIENT)
Dept: OBGYN | Facility: CLINIC | Age: 42
End: 2022-09-07

## 2022-09-07 VITALS
WEIGHT: 181 LBS | DIASTOLIC BLOOD PRESSURE: 70 MMHG | HEIGHT: 60 IN | SYSTOLIC BLOOD PRESSURE: 110 MMHG | BODY MASS INDEX: 35.53 KG/M2

## 2022-09-07 PROCEDURE — 99024 POSTOP FOLLOW-UP VISIT: CPT

## 2022-11-01 ENCOUNTER — APPOINTMENT (OUTPATIENT)
Dept: OBGYN | Facility: CLINIC | Age: 42
End: 2022-11-01

## 2022-11-01 VITALS — HEIGHT: 60 IN | DIASTOLIC BLOOD PRESSURE: 76 MMHG | SYSTOLIC BLOOD PRESSURE: 115 MMHG

## 2022-11-01 DIAGNOSIS — Z87.42 PERSONAL HISTORY OF OTHER DISEASES OF THE FEMALE GENITAL TRACT: ICD-10-CM

## 2022-11-01 DIAGNOSIS — R87.613 HIGH GRADE SQUAMOUS INTRAEPITHELIAL LESION ON CYTOLOGIC SMEAR OF CERVIX (HGSIL): ICD-10-CM

## 2022-11-01 LAB
HCG UR QL: NEGATIVE
QUALITY CONTROL: YES

## 2022-11-01 PROCEDURE — 81025 URINE PREGNANCY TEST: CPT

## 2022-11-01 PROCEDURE — 58558Z: CUSTOM

## 2022-11-01 PROCEDURE — 57460 BX OF CERVIX W/SCOPE LEEP: CPT

## 2022-11-01 NOTE — PROCEDURE
[Colposcopy with Loop Electrode Excision of the Cervix] : Colposcopy with loop electrode excision of the cervix [Moderate Dysplasia] : moderate dysplasia [Injury to Vagina] : injury to vagina [Injury to Cervix] : injury to cervix [Negative] : negative pregnancy test [Pap Performed] : pap not performed [SCJ Fully Visualized] : SCJ fully visualized [Cutting Setting ___watts] : cutting setting [unfilled] landeros [Coag Setting ___watts] : coag setting [unfilled] landeros [Blend Setting ___watts] : blend setting [unfilled] landeros [Hemostasis Obtained] : Hemostasis obtained [Hysteroscopy] : Hysteroscopy [Time out performed] : Pre-procedure time out performed.  Patient's name, date of birth and procedure confirmed. [Consent Obtained] : Consent obtained [Abnormal uterine bleeding] : abnormal uterine bleeding [Risks] : risks [Benefits] : benefits [Alternatives] : alternatives [Patient] : patient [Infection] : infection [Bleeding] : bleeding [Allergic Reaction] : allergic reaction [Lidocaine___ mL] : [unfilled] ~UmL of lidocaine [flexible] : Using aseptic technique a hysteroscopy was performed using a flexible hysteroscope [Sent to Pathology] : specimen was placed in buffered formalin and sent for pathology [Antibiotics given] : antibiotics not given [Hemostasis obtained] : hemostasis obtained [Tolerated Well] : Patient tolerated the procedure well [Aftercare instructions/regstrictions given and follow-up scheduled] : Aftercare instructions/restrictions given and follow-up scheduled [de-identified] : Under sterile condition and with paracervical block the cervix was dilated and endometrial sampling obtained and sent to pathology.  Hysteroscopic evaluation shows mild endometrial lush features.  No myomas or polyps noted.  Patient tolerated the procedure well.

## 2022-11-08 LAB — CORE LAB BIOPSY: NORMAL

## 2022-11-29 ENCOUNTER — APPOINTMENT (OUTPATIENT)
Dept: OBGYN | Facility: CLINIC | Age: 42
End: 2022-11-29

## 2023-03-06 NOTE — ED STATDOCS - NEUROLOGICAL, MLM
----- Message from Ashish Santiago sent at 3/6/2023 10:38 AM CST -----  Simvastatin 20 mg tab  Qty [address]  [address]     
Rx sent  
sensation is normal and strength is normal.

## 2025-08-05 ENCOUNTER — APPOINTMENT (OUTPATIENT)
Dept: OBGYN | Facility: CLINIC | Age: 45
End: 2025-08-05
Payer: COMMERCIAL

## 2025-08-05 VITALS
WEIGHT: 223 LBS | HEIGHT: 60 IN | SYSTOLIC BLOOD PRESSURE: 122 MMHG | BODY MASS INDEX: 43.78 KG/M2 | DIASTOLIC BLOOD PRESSURE: 70 MMHG

## 2025-08-05 DIAGNOSIS — R87.613 HIGH GRADE SQUAMOUS INTRAEPITHELIAL LESION ON CYTOLOGIC SMEAR OF CERVIX (HGSIL): ICD-10-CM

## 2025-08-05 DIAGNOSIS — Z01.419 ENCOUNTER FOR GYNECOLOGICAL EXAMINATION (GENERAL) (ROUTINE) W/OUT ABNORMAL FINDINGS: ICD-10-CM

## 2025-08-05 PROCEDURE — 99214 OFFICE O/P EST MOD 30 MIN: CPT | Mod: 25

## 2025-08-05 PROCEDURE — 99396 PREV VISIT EST AGE 40-64: CPT

## 2025-08-05 PROCEDURE — 99459 PELVIC EXAMINATION: CPT | Mod: NC

## 2025-08-05 PROCEDURE — 36415 COLL VENOUS BLD VENIPUNCTURE: CPT

## 2025-08-06 LAB
ALBUMIN SERPL ELPH-MCNC: 4.5 G/DL
ALP BLD-CCNC: 110 U/L
ALT SERPL-CCNC: 24 U/L
ANION GAP SERPL CALC-SCNC: 26 MMOL/L
AST SERPL-CCNC: 20 U/L
BILIRUB SERPL-MCNC: 0.2 MG/DL
BUN SERPL-MCNC: 16 MG/DL
CALCIUM SERPL-MCNC: 9.9 MG/DL
CHLORIDE SERPL-SCNC: 97 MMOL/L
CO2 SERPL-SCNC: 20 MMOL/L
CREAT SERPL-MCNC: 0.66 MG/DL
EGFRCR SERPLBLD CKD-EPI 2021: 110 ML/MIN/1.73M2
ESTIMATED AVERAGE GLUCOSE: 114 MG/DL
FSH SERPL-MCNC: 23.5 IU/L
GLUCOSE SERPL-MCNC: 32 MG/DL
HBA1C MFR BLD HPLC: 5.6 %
LH SERPL-ACNC: 14 IU/L
POTASSIUM SERPL-SCNC: 3.9 MMOL/L
PROLACTIN SERPL-MCNC: 7.4 NG/ML
PROT SERPL-MCNC: 7.1 G/DL
SODIUM SERPL-SCNC: 144 MMOL/L
T3FREE SERPL-MCNC: 3.43 PG/ML
T4 FREE SERPL-MCNC: 1.4 NG/DL
TESTOST SERPL-MCNC: 39.6 NG/DL
TSH SERPL-ACNC: 0.65 UIU/ML

## 2025-08-07 LAB
C TRACH RRNA SPEC QL NAA+PROBE: NOT DETECTED
HPV HIGH+LOW RISK DNA PNL CVX: NOT DETECTED
N GONORRHOEA RRNA SPEC QL NAA+PROBE: NOT DETECTED
SOURCE TP AMPLIFICATION: NORMAL

## 2025-08-08 LAB — CYTOLOGY CVX/VAG DOC THIN PREP: ABNORMAL

## 2025-08-11 ENCOUNTER — APPOINTMENT (OUTPATIENT)
Dept: MAMMOGRAPHY | Facility: CLINIC | Age: 45
End: 2025-08-11

## 2025-09-03 ENCOUNTER — APPOINTMENT (OUTPATIENT)
Dept: OBGYN | Facility: CLINIC | Age: 45
End: 2025-09-03